# Patient Record
Sex: MALE | URBAN - METROPOLITAN AREA
[De-identification: names, ages, dates, MRNs, and addresses within clinical notes are randomized per-mention and may not be internally consistent; named-entity substitution may affect disease eponyms.]

---

## 2023-05-10 ENCOUNTER — APPOINTMENT (RX ONLY)
Dept: URBAN - METROPOLITAN AREA CLINIC 94 | Facility: CLINIC | Age: 28
Setting detail: DERMATOLOGY
End: 2023-05-10

## 2023-05-10 VITALS — HEIGHT: 69 IN | WEIGHT: 265 LBS

## 2023-05-10 DIAGNOSIS — L05.91 PILONIDAL CYST WITHOUT ABSCESS: ICD-10-CM | Status: INADEQUATELY CONTROLLED

## 2023-05-10 PROCEDURE — ? TREATMENT REGIMEN

## 2023-05-10 PROCEDURE — ? ADDITIONAL NOTES

## 2023-05-10 PROCEDURE — ? COUNSELING

## 2023-05-10 PROCEDURE — ? PRESCRIPTION

## 2023-05-10 PROCEDURE — 99204 OFFICE O/P NEW MOD 45 MIN: CPT

## 2023-05-10 RX ORDER — DOXYCYCLINE 100 MG/1
CAPSULE ORAL
Qty: 60 | Refills: 0 | Status: ERX | COMMUNITY
Start: 2023-05-10

## 2023-05-10 RX ORDER — MUPIROCIN 20 MG/G
OINTMENT TOPICAL
Qty: 22 | Refills: 2 | Status: ERX | COMMUNITY
Start: 2023-05-10

## 2023-05-10 RX ADMIN — DOXYCYCLINE: 100 CAPSULE ORAL at 00:00

## 2023-05-10 RX ADMIN — MUPIROCIN: 20 OINTMENT TOPICAL at 00:00

## 2023-05-10 ASSESSMENT — LOCATION SIMPLE DESCRIPTION DERM: LOCATION SIMPLE: LOWER BACK

## 2023-05-10 ASSESSMENT — LOCATION ZONE DERM: LOCATION ZONE: TRUNK

## 2023-05-10 ASSESSMENT — LOCATION DETAILED DESCRIPTION DERM: LOCATION DETAILED: INFERIOR LUMBAR SPINE

## 2023-05-10 NOTE — PROCEDURE: ADDITIONAL NOTES
Render Risk Assessment In Note?: yes
Detail Level: Simple
Additional Notes: Pt advised to follow up with general surgeon for removal

## 2023-05-10 NOTE — PROCEDURE: TREATMENT REGIMEN
Initiate Treatment: doxycycline monohydrate 100 mg capsule \\nTake 1 capsule PO BID with food\\n\\nmupirocin 2 % topical ointment \\nApply to affected area on buttocks twice daily
Detail Level: Zone